# Patient Record
Sex: FEMALE | Race: WHITE | NOT HISPANIC OR LATINO | URBAN - METROPOLITAN AREA
[De-identification: names, ages, dates, MRNs, and addresses within clinical notes are randomized per-mention and may not be internally consistent; named-entity substitution may affect disease eponyms.]

---

## 2017-07-25 ENCOUNTER — OUTPATIENT (OUTPATIENT)
Dept: OUTPATIENT SERVICES | Facility: HOSPITAL | Age: 72
LOS: 1 days | Discharge: HOME | End: 2017-07-25

## 2017-07-25 DIAGNOSIS — Z12.31 ENCOUNTER FOR SCREENING MAMMOGRAM FOR MALIGNANT NEOPLASM OF BREAST: ICD-10-CM

## 2017-07-26 DIAGNOSIS — Z78.0 ASYMPTOMATIC MENOPAUSAL STATE: ICD-10-CM

## 2017-07-26 DIAGNOSIS — Z13.820 ENCOUNTER FOR SCREENING FOR OSTEOPOROSIS: ICD-10-CM

## 2017-07-26 DIAGNOSIS — M89.9 DISORDER OF BONE, UNSPECIFIED: ICD-10-CM

## 2019-04-02 ENCOUNTER — EMERGENCY (EMERGENCY)
Facility: HOSPITAL | Age: 74
LOS: 0 days | Discharge: HOME | End: 2019-04-02
Attending: EMERGENCY MEDICINE | Admitting: EMERGENCY MEDICINE
Payer: MEDICARE

## 2019-04-02 VITALS
HEART RATE: 68 BPM | RESPIRATION RATE: 18 BRPM | TEMPERATURE: 98 F | DIASTOLIC BLOOD PRESSURE: 78 MMHG | OXYGEN SATURATION: 98 % | SYSTOLIC BLOOD PRESSURE: 183 MMHG

## 2019-04-02 DIAGNOSIS — S61.210A LACERATION WITHOUT FOREIGN BODY OF RIGHT INDEX FINGER WITHOUT DAMAGE TO NAIL, INITIAL ENCOUNTER: ICD-10-CM

## 2019-04-02 DIAGNOSIS — S00.83XA CONTUSION OF OTHER PART OF HEAD, INITIAL ENCOUNTER: ICD-10-CM

## 2019-04-02 DIAGNOSIS — S09.90XA UNSPECIFIED INJURY OF HEAD, INITIAL ENCOUNTER: ICD-10-CM
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
## 2019-04-02 DIAGNOSIS — Z23 ENCOUNTER FOR IMMUNIZATION: ICD-10-CM

## 2019-04-02 DIAGNOSIS — I10 ESSENTIAL (PRIMARY) HYPERTENSION: ICD-10-CM
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
## 2019-04-02 DIAGNOSIS — Z79.899 OTHER LONG TERM (CURRENT) DRUG THERAPY: ICD-10-CM

## 2019-04-02 PROCEDURE — 99284 EMERGENCY DEPT VISIT MOD MDM: CPT

## 2019-04-02 PROCEDURE — 70486 CT MAXILLOFACIAL W/O DYE: CPT | Mod: 26

## 2019-04-02 PROCEDURE — 70450 CT HEAD/BRAIN W/O DYE: CPT | Mod: 26

## 2019-04-02 PROCEDURE — 73562 X-RAY EXAM OF KNEE 3: CPT | Mod: 26,LT

## 2019-04-02 RX ORDER — TETANUS TOXOID, REDUCED DIPHTHERIA TOXOID AND ACELLULAR PERTUSSIS VACCINE, ADSORBED 5; 2.5; 8; 8; 2.5 [IU]/.5ML; [IU]/.5ML; UG/.5ML; UG/.5ML; UG/.5ML
0.5 SUSPENSION INTRAMUSCULAR ONCE
Qty: 0 | Refills: 0 | Status: COMPLETED | OUTPATIENT
Start: 2019-04-02 | End: 2019-04-02

## 2019-04-02 RX ORDER — LISINOPRIL 2.5 MG/1
0 TABLET ORAL
Qty: 0 | Refills: 0 | COMMUNITY

## 2019-04-02 RX ADMIN — TETANUS TOXOID, REDUCED DIPHTHERIA TOXOID AND ACELLULAR PERTUSSIS VACCINE, ADSORBED 0.5 MILLILITER(S): 5; 2.5; 8; 8; 2.5 SUSPENSION INTRAMUSCULAR at 18:25

## 2019-04-02 NOTE — ED PROVIDER NOTE - PHYSICAL EXAMINATION
VITAL SIGNS: I have reviewed nursing notes and confirm.  CONSTITUTIONAL: Well-developed; well-nourished; in no acute distress.  SKIN: 0.5cm superficial laceration to right palmar aspect of 2nd digit of hand. +Abrasion to left knee with mild tenderness. +Ecchymosis to frontal forehead. Remainder of skin exam is warm and dry, no acute rash.  HEAD: See above.   EYES: PERRL, EOM intact; conjunctiva and sclera clear.  ENT: No nasal discharge; airway clear.   NECK: Supple; non tender. No C-spine tenderness.   CARD: S1, S2 normal; no murmurs, gallops, or rubs. Regular rate and rhythm.  RESP: Clear to auscultation bilaterally. No wheezes, rales or rhonchi.  ABD: Normal bowel sounds; soft; non-distended; non-tender.   EXT/MSK: Normal ROM. No edema. +Left knee tenderness. No midline tenderness.   LYMPH: No acute cervical adenopathy.  NEURO: Alert, oriented. Grossly unremarkable. No focal deficits.  PSYCH: Cooperative, appropriate.

## 2019-04-02 NOTE — ED PROVIDER NOTE - OBJECTIVE STATEMENT
74 yo F with pmhx of HTN presenting for mechanical fall earlier today while at  [employer] falling forward with +head injury. No LOC. No anticoagulation. Reports abrasion to left knee and cut to right 2nd finger. Symptoms are moderate. No alleviating or aggravating factors. No cp, sob, fever, chills, abdominal pain, nausea, vomiting, diarrhea, back pain, urinary symptoms, headache, dizziness, paresthesias, or weakness.

## 2019-04-02 NOTE — ED PROVIDER NOTE - CLINICAL SUMMARY MEDICAL DECISION MAKING FREE TEXT BOX
No acute fracture on imaging or acute intracranial process. Ambulatory without difficulty. Feels and appears well. No complaints at present. Eager to leave. Stable for discharge. Concussion precautions discussed.. Patient was given strict return and follow up precautions. The patient has been informed of all concerning signs and symptoms to return to Emergency Department, the necessity to follow up with PMD/Clinic/follow up provided within 2-3 days was explained, and the patient reports understanding of above with capacity and insight.

## 2019-04-02 NOTE — ED ADULT NURSE NOTE - OBJECTIVE STATEMENT
pt was walking at [place] when she missed the curved curb and fell. pt hit left forehead which has a bump, bruising to bridge of nose, left shoulder pain, and left knee abrasion and pain. pt denies loc. not on blood thinners

## 2019-04-02 NOTE — ED PROVIDER NOTE - ATTENDING CONTRIBUTION TO CARE
Closed head trauma - ambulatory after, no loc, no midline neck pain, no change in vision, no focal numbness or weakness, no radicular pain, no chest pain, no abdominal pain, no dyspnea, no pleuritic pain, no dysphagia, no odynophagia. Multiple facial ecchymosis, no pain with eye movement, no entrapment. Closed head trauma - ambulatory after, no loc, no midline neck pain, no change in vision, no focal numbness or weakness, no radicular pain, no chest pain, no abdominal pain, no dyspnea, no pleuritic pain, no dysphagia, no odynophagia. Multiple facial ecchymosis, no pain with eye movement, no entrapment. - cth ctmf, ctcspine, analgesia prn reassess.

## 2019-04-02 NOTE — ED PROVIDER NOTE - NS ED ROS FT
Review of Systems:  	•	CONSTITUTIONAL - no fever, no diaphoresis, no chills  	•	SKIN - +laceration, +abrasion, no rash  	•	HEMATOLOGIC - no bleeding, +bruising  	•	EYES - no eye pain, no blurry vision  	•	ENT - no congestion  	•	RESPIRATORY - no shortness of breath, no cough  	•	CARDIAC - no chest pain, no palpitations  	•	GI - no abd pain, no nausea, no vomiting, no diarrhea, no constipation  	•	GENITO-URINARY - no dysuria; no hematuria, no increased urinary frequency  	•	MUSCULOSKELETAL - no joint paint, no swelling, no redness  	•	NEUROLOGIC - +head injury, no weakness, no headache, no paresthesias, no LOC  	•	PSYCH - no anxiety, no depression  	All other ROS are negative except as documented in HPI.

## 2019-04-02 NOTE — ED PROVIDER NOTE - CARE PLAN
Principal Discharge DX:	Closed head injury  Secondary Diagnosis:	Abrasion  Secondary Diagnosis:	Finger laceration

## 2019-04-02 NOTE — ED PROVIDER NOTE - NSFOLLOWUPINSTRUCTIONS_ED_ALL_ED_FT
Closed Head Injury    Closed head injury in an injury to your head that may or may not involve a traumatic brain injury (TBI). Symptoms of TBI can be short or long lasting and include headache, dizziness, interference with memory or speech, fatigue, confusion, changes in sleep, mood changes, nausea, depression/anxiety, and dulling of senses. Make sure to obtain proper rest which includes getting plenty of sleep, avoiding excessive visual stimulation, and avoiding activities that may cause physical or mental stress. Avoid any situation where there is potential for another head injury including sports.    SEEK MEDICAL CARE IF YOU HAVE THE FOLLOWING SYMPTOMS: unusual drowsiness, vomiting, severe dizziness, seizures, lightheadedness, muscular weakness, different pupil sizes, visual changes, or clear or bloody discharge from your ears or nose.     Laceration    A laceration is a cut that goes through all of the layers of the skin and into the tissue that is right under the skin. Some lacerations heal on their own. Others need to be closed with stitches (sutures), staples, skin adhesive strips, or skin glue. Proper laceration care minimizes the risk of infection and helps the laceration to heal better.     SEEK IMMEDIATE MEDICAL CARE IF YOU HAVE THE FOLLOWING SYMPTOMS: swelling around the wound, worsening pain, drainage from the wound, red streaking going away from your wound, inability to move finger or toe near the laceration, or discoloration of skin near the laceration.    Contusion    A contusion is a deep bruise. Contusions are the result of a blunt injury to tissues and muscle fibers under the skin. The skin overlying the contusion may turn blue, purple, or yellow. Symptoms also include pain and swelling in the injured area.    SEEK IMMEDIATE MEDICAL CARE IF YOU HAVE THE FOLLOWING SYMPTOMS: severe pain, numbness, tingling, pain, weakness, or skin color/temperature change in any part of your body distal to the fracture.

## 2019-04-02 NOTE — ED PROVIDER NOTE - CARE PROVIDER_API CALL
Your primary care provider,   Phone: (   )    -  Fax: (   )    -  Follow Up Time: 1-3 Days    Return to ED in 10-14 days for suture removal,   Phone: (   )    -  Fax: (   )    -  Follow Up Time:

## 2019-04-02 NOTE — ED PROVIDER NOTE - PROVIDER TOKENS
FREE:[LAST:[Your primary care provider],PHONE:[(   )    -],FAX:[(   )    -],FOLLOWUP:[1-3 Days]],FREE:[LAST:[Return to ED in 10-14 days for suture removal],PHONE:[(   )    -],FAX:[(   )    -]]

## 2024-01-17 ENCOUNTER — NEW PATIENT COMPREHENSIVE (OUTPATIENT)
Dept: URBAN - METROPOLITAN AREA CLINIC 40 | Facility: CLINIC | Age: 79
End: 2024-01-17

## 2024-01-17 DIAGNOSIS — H52.7: ICD-10-CM

## 2024-01-17 DIAGNOSIS — H02.834: ICD-10-CM

## 2024-01-17 DIAGNOSIS — Z96.1: ICD-10-CM

## 2024-01-17 DIAGNOSIS — H02.831: ICD-10-CM

## 2024-01-17 DIAGNOSIS — H26.493: ICD-10-CM

## 2024-01-17 DIAGNOSIS — H04.123: ICD-10-CM

## 2024-01-17 PROCEDURE — 99204 OFFICE O/P NEW MOD 45 MIN: CPT

## 2024-01-17 PROCEDURE — 92015 DETERMINE REFRACTIVE STATE: CPT

## 2024-01-17 ASSESSMENT — VISUAL ACUITY
OU_CC: J1+
OD_CC: 20/40
OU_CC: 20/30
OS_CC: 20/40-2

## 2024-01-17 ASSESSMENT — TONOMETRY
OS_IOP_MMHG: 16
OD_IOP_MMHG: 16

## 2024-03-12 ENCOUNTER — CONSULTATION (OUTPATIENT)
Dept: URBAN - METROPOLITAN AREA CLINIC 40 | Facility: CLINIC | Age: 79
End: 2024-03-12

## 2024-03-12 DIAGNOSIS — H02.832: ICD-10-CM

## 2024-03-12 DIAGNOSIS — H02.831: ICD-10-CM

## 2024-03-12 DIAGNOSIS — H04.123: ICD-10-CM

## 2024-03-12 DIAGNOSIS — H02.835: ICD-10-CM

## 2024-03-12 DIAGNOSIS — H02.834: ICD-10-CM

## 2024-03-12 PROCEDURE — 92285 EXTERNAL OCULAR PHOTOGRAPHY: CPT

## 2024-03-12 PROCEDURE — 99213 OFFICE O/P EST LOW 20 MIN: CPT

## 2024-03-12 ASSESSMENT — TONOMETRY
OS_IOP_MMHG: 17
OD_IOP_MMHG: 14

## 2024-03-12 ASSESSMENT — VISUAL ACUITY
OS_SC: 20/40-2
OD_SC: 20/40

## 2024-03-22 ENCOUNTER — DIAGNOSTICS ONLY (OUTPATIENT)
Dept: URBAN - METROPOLITAN AREA CLINIC 40 | Facility: CLINIC | Age: 79
End: 2024-03-22

## 2024-03-22 DIAGNOSIS — H02.834: ICD-10-CM

## 2024-03-22 DIAGNOSIS — H02.831: ICD-10-CM

## 2024-03-22 PROCEDURE — 92082 INTERMEDIATE VISUAL FIELD XM: CPT | Mod: NC

## 2024-04-25 ENCOUNTER — DIAGNOSTICS ONLY (OUTPATIENT)
Dept: URBAN - METROPOLITAN AREA CLINIC 40 | Facility: CLINIC | Age: 79
End: 2024-04-25

## 2024-04-25 DIAGNOSIS — H02.831: ICD-10-CM

## 2024-04-25 DIAGNOSIS — H02.834: ICD-10-CM

## 2024-04-25 PROCEDURE — 92082 INTERMEDIATE VISUAL FIELD XM: CPT

## 2024-05-17 ENCOUNTER — FOLLOW UP (OUTPATIENT)
Dept: URBAN - METROPOLITAN AREA CLINIC 40 | Facility: CLINIC | Age: 79
End: 2024-05-17

## 2024-05-17 DIAGNOSIS — H52.7: ICD-10-CM

## 2024-05-17 DIAGNOSIS — H04.123: ICD-10-CM

## 2024-05-17 DIAGNOSIS — H26.493: ICD-10-CM

## 2024-05-17 PROCEDURE — 92012 INTRM OPH EXAM EST PATIENT: CPT

## 2024-05-17 ASSESSMENT — TONOMETRY
OD_IOP_MMHG: 14
OS_IOP_MMHG: 16

## 2024-05-17 ASSESSMENT — VISUAL ACUITY
OD_CC: 20/40
OS_PH: 20/40
OU_CC: J2
OD_PH: 20/40
OS_CC: 20/40-2

## 2024-06-04 ENCOUNTER — PROCEDURE ONLY (OUTPATIENT)
Dept: URBAN - METROPOLITAN AREA CLINIC 40 | Facility: CLINIC | Age: 79
End: 2024-06-04

## 2024-06-04 DIAGNOSIS — H02.834: ICD-10-CM

## 2024-06-04 DIAGNOSIS — H02.831: ICD-10-CM

## 2024-06-04 PROCEDURE — 15823 BLEPHARP UPR EYELID XCSV SKN: CPT | Mod: 50

## 2024-07-02 ENCOUNTER — PROBLEM (OUTPATIENT)
Dept: URBAN - METROPOLITAN AREA CLINIC 40 | Facility: CLINIC | Age: 79
End: 2024-07-02

## 2024-07-02 DIAGNOSIS — H02.834: ICD-10-CM

## 2024-07-02 DIAGNOSIS — H02.831: ICD-10-CM

## 2024-07-02 PROCEDURE — 99024 POSTOP FOLLOW-UP VISIT: CPT

## 2024-07-02 ASSESSMENT — TONOMETRY
OS_IOP_MMHG: 17
OD_IOP_MMHG: 12

## 2024-07-02 ASSESSMENT — VISUAL ACUITY
OS_PH: 20/50
OU_CC: J1
OD_CC: 20/40-1
OS_CC: 20/60

## 2025-06-20 ENCOUNTER — ESTABLISHED COMPREHENSIVE EXAM (OUTPATIENT)
Dept: URBAN - METROPOLITAN AREA CLINIC 40 | Facility: CLINIC | Age: 80
End: 2025-06-20

## 2025-06-20 DIAGNOSIS — Z96.1: ICD-10-CM

## 2025-06-20 DIAGNOSIS — H04.123: ICD-10-CM

## 2025-06-20 DIAGNOSIS — H26.493: ICD-10-CM

## 2025-06-20 PROCEDURE — 92014 COMPRE OPH EXAM EST PT 1/>: CPT

## 2025-06-20 ASSESSMENT — VISUAL ACUITY
OS_CC: 20/80
OD_PH: 20/40
OD_CC: 20/60
OS_PH: 20/60
OU_CC: J1

## 2025-06-20 ASSESSMENT — TONOMETRY
OD_IOP_MMHG: 15
OS_IOP_MMHG: 17

## 2025-07-16 ENCOUNTER — CLINIC PROCEDURE ONLY (OUTPATIENT)
Dept: URBAN - METROPOLITAN AREA CLINIC 40 | Facility: CLINIC | Age: 80
End: 2025-07-16

## 2025-07-16 DIAGNOSIS — H26.493: ICD-10-CM

## 2025-07-16 PROCEDURE — 66821 AFTER CATARACT LASER SURGERY: CPT

## 2025-07-16 ASSESSMENT — VISUAL ACUITY
OU_CC: J1+
OS_PH: 20/60
OD_CC: 20/100-1
OS_CC: 20/100
OD_PH: 20/70

## 2025-07-16 ASSESSMENT — TONOMETRY
OD_IOP_MMHG: 16
OS_IOP_MMHG: 18

## (undated) RX ORDER — PREDNISOLONE ACETATE 10 MG/ML: 1 SUSPENSION/ DROPS OPHTHALMIC

## (undated) RX ORDER — ERYTHROMYCIN 5 MG/G: 1/4 OINTMENT OPHTHALMIC